# Patient Record
Sex: MALE | Race: WHITE | Employment: FULL TIME | URBAN - METROPOLITAN AREA
[De-identification: names, ages, dates, MRNs, and addresses within clinical notes are randomized per-mention and may not be internally consistent; named-entity substitution may affect disease eponyms.]

---

## 2020-06-15 ENCOUNTER — HOSPITAL ENCOUNTER (OUTPATIENT)
Dept: CT IMAGING | Age: 50
Discharge: HOME | End: 2020-06-15
Attending: FAMILY MEDICINE
Payer: COMMERCIAL

## 2020-06-15 DIAGNOSIS — R07.9 CHEST PAIN, UNSPECIFIED: ICD-10-CM

## 2020-06-15 PROCEDURE — 75571 CT HRT W/O DYE W/CA TEST: CPT

## 2020-06-22 ENCOUNTER — TELEPHONE (OUTPATIENT)
Dept: OTHER | Age: 50
End: 2020-06-22

## 2020-06-22 NOTE — TELEPHONE ENCOUNTER
Patient identity verified and matched to demographic data. Patient notified of Coronary Calcium Score of 137       Patient education provided to maintain healthy lifestyle, healthy diet, and regular exercise. Patient stated that he has already seen a cardiologist since his father had CABG x 3at 46years old. Awaiting test results from the further testing that he had done. Patient expressed concern about the chest pain that he is having and went over the signs and symptoms of a heart attack and that he should seek emergency care if he has any of those symptoms. Encouraged him to be his own advocate and to call the Cardiology office to schedule a follow up for his results of the testing that they ordered and further intervention. He stated that he has a very stressful job and that contributes to the chest pain. Follow up recommended with cardiologist he is currently seeing and his Primary Care MD To. Patient verbalized understanding of results, patient education, and follow up care.